# Patient Record
Sex: FEMALE | Race: WHITE | NOT HISPANIC OR LATINO | Employment: FULL TIME | URBAN - METROPOLITAN AREA
[De-identification: names, ages, dates, MRNs, and addresses within clinical notes are randomized per-mention and may not be internally consistent; named-entity substitution may affect disease eponyms.]

---

## 2017-04-01 ENCOUNTER — TRANSFERRED RECORDS (OUTPATIENT)
Dept: HEALTH INFORMATION MANAGEMENT | Facility: CLINIC | Age: 27
End: 2017-04-01

## 2017-04-01 LAB — PAP SMEAR - HIM PATIENT REPORTED: NEGATIVE

## 2018-11-20 ENCOUNTER — TRANSFERRED RECORDS (OUTPATIENT)
Dept: HEALTH INFORMATION MANAGEMENT | Facility: CLINIC | Age: 28
End: 2018-11-20

## 2018-11-21 ENCOUNTER — TRANSFERRED RECORDS (OUTPATIENT)
Dept: HEALTH INFORMATION MANAGEMENT | Facility: CLINIC | Age: 28
End: 2018-11-21

## 2018-12-03 ENCOUNTER — TELEPHONE (OUTPATIENT)
Dept: OBGYN | Facility: CLINIC | Age: 28
End: 2018-12-03

## 2018-12-03 ENCOUNTER — OFFICE VISIT (OUTPATIENT)
Dept: OBGYN | Facility: CLINIC | Age: 28
End: 2018-12-03
Payer: COMMERCIAL

## 2018-12-03 VITALS
BODY MASS INDEX: 24.27 KG/M2 | DIASTOLIC BLOOD PRESSURE: 64 MMHG | WEIGHT: 151 LBS | HEIGHT: 66 IN | SYSTOLIC BLOOD PRESSURE: 110 MMHG | HEART RATE: 72 BPM

## 2018-12-03 DIAGNOSIS — N85.6 ASHERMAN'S SYNDROME: Primary | ICD-10-CM

## 2018-12-03 PROCEDURE — 99204 OFFICE O/P NEW MOD 45 MIN: CPT | Performed by: OBSTETRICS & GYNECOLOGY

## 2018-12-03 ASSESSMENT — ANXIETY QUESTIONNAIRES
2. NOT BEING ABLE TO STOP OR CONTROL WORRYING: NOT AT ALL
1. FEELING NERVOUS, ANXIOUS, OR ON EDGE: NOT AT ALL
GAD7 TOTAL SCORE: 0
5. BEING SO RESTLESS THAT IT IS HARD TO SIT STILL: NOT AT ALL
7. FEELING AFRAID AS IF SOMETHING AWFUL MIGHT HAPPEN: NOT AT ALL
6. BECOMING EASILY ANNOYED OR IRRITABLE: NOT AT ALL
3. WORRYING TOO MUCH ABOUT DIFFERENT THINGS: NOT AT ALL

## 2018-12-03 ASSESSMENT — PATIENT HEALTH QUESTIONNAIRE - PHQ9
SUM OF ALL RESPONSES TO PHQ QUESTIONS 1-9: 0
5. POOR APPETITE OR OVEREATING: NOT AT ALL

## 2018-12-03 NOTE — PROGRESS NOTES
SUBJECTIVE:                                                   Marium Stahl is a 28 year old female who presents to clinic today for the following health issue(s):  Patient presents with:  Consult: here to discuss scaring that needs to be figured out before another IVF cycle.        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  She had a postpartum hemorrhage for retained placenta and now has ultrasound evidence of extensive intrauterine scarring.  She still has fairly regular cycles but they are abnormal.  She does not report any severe central pelvic pain.  She currently works full-time for [employer]      Patient's last menstrual period was 11/13/2018..   Patient is sexually active with female partner, No obstetric history on file..  Using none for contraception.    reports that she has never smoked. She has never used smokeless tobacco.    STD testing offered?  Declined    Health maintenance updated:  yes    Today's PHQ-9 Score:   PHQ-9 SCORE 12/3/2018   PHQ-9 Total Score 0     Today's ANGLE-7 Score:   ANGLE-7 SCORE 12/3/2018   Total Score 0       Problem list and histories reviewed & adjusted, as indicated.  Additional history: as documented.    There is no problem list on file for this patient.    Past Surgical History:   Procedure Laterality Date     APPENDECTOMY  1997     dilation and curretage  07/2017      Social History   Substance Use Topics     Smoking status: Never Smoker     Smokeless tobacco: Never Used     Alcohol use Not on file      Problem (# of Occurrences) Relation (Name,Age of Onset)    Breast Cancer (1) Maternal Aunt    Diabetes (1) Maternal Grandmother    Heart Disease (1) Paternal Grandmother    Hyperlipidemia (2) Mother, Father    Hypertension (1) Mother            Current Outpatient Prescriptions   Medication Sig     UNABLE TO FIND MEDICATION NAME: birth control     No current facility-administered medications for this visit.      Allergies  "  Allergen Reactions     Shellfish-Derived Products        ROS:  12 point review of systems negative other than symptoms noted below.    OBJECTIVE:     /64  Pulse 72  Ht 5' 6\" (1.676 m)  Wt 151 lb (68.5 kg)  LMP 11/13/2018  BMI 24.37 kg/m2  Body mass index is 24.37 kg/(m^2).    Exam:  Constitutional:  Appearance: Well nourished, well developed alert, in no acute distress  Neck:  Lymph Nodes:  No lymphadenopathy present; Thyroid:  Gland size normal, nontender, no nodules or masses present on palpation  Chest:  Respiratory Effort:  Breathing unlabored  Cardiovascular: Heart: Auscultation:  Regular rate, normal rhythm, no murmurs present  Gastrointestinal:  Abdominal Examination:  Abdomen nontender to palpation, tone normal without rigidity or guarding, no masses present, umbilicus without lesions; Liver/Spleen:  No hepatomegaly present, liver nontender to palpation; Hernias:  No hernias present  Lymphatic: Lymph Nodes:  No other lymphadenopathy present  Skin:General Inspection:  No rashes present, no lesions present, no areas of discoloration; Genitalia and Groin:  No rashes present, no lesions present, no areas of discoloration, no masses present.  Neurologic/Psychiatric:  Mental Status:  Oriented X3   Pelvic Exam:  External Genitalia:     Normal appearance for age, no discharge present, no tenderness present, no inflammatory lesions present, color normal  Vagina:     Normal vaginal vault without central or paravaginal defects, no discharge present, no inflammatory lesions present, no masses present  Bladder:     Nontender to palpation  Urethra:   Urethral Body:  Urethra palpation normal, urethra structural support normal   Urethral Meatus:  No erythema or lesions present  Cervix:     Appearance healthy, no lesions present, nontender to palpation, no bleeding present  Uterus:     Uterus: firm, normal sized and nontender, anteverted in position.   Adnexa:     No adnexal tenderness present, no adnexal masses " present  Perineum:     Perineum within normal limits, no evidence of trauma, no rashes or skin lesions present  Anus:     Anus within normal limits, no hemorrhoids present  Inguinal Lymph Nodes:     No lymphadenopathy present  Pubic Hair:     Normal pubic hair distribution for age  Genitalia and Groin:     No rashes present, no lesions present, no areas of discoloration, no masses present       In-Clinic Test Results:      ASSESSMENT/PLAN:                                                      28-year-old  1 with history of postpartum hemorrhage with delivery of an exhilarated lobe and a D&C 13 days later.  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  We recommend a hysteroscopy with lysis of adhesions.  The risks and complications of the procedure were reviewed at length.  This includes general anesthesia blood loss infection injury to bowel bladder and ureters from uterine perforation.  The patient will schedule at her disposition.  She is on birth control pills and should continue them for ongoing suppression.      Davion Egan MD  [facility] [place]

## 2018-12-03 NOTE — Clinical Note
Please abstract the following data from this visit with this patient into the appropriate field in Epic:  Pap smear done on this date: 4/2017 (approximately), by this group: [facility], results were normal.

## 2018-12-03 NOTE — NURSING NOTE
Please abstract the following data from this visit with this patient into the appropriate field in Epic:    Pap smear done on this date: 4/2017 (approximately), by this group: [facility], results were normal.

## 2018-12-03 NOTE — TELEPHONE ENCOUNTER
Type of surgery: HSC w/LYSIS of ADHESIONS  Location of surgery: [facility] OR  Date and time of surgery: 12/13/2018 10:45am ARRIVAL 8:45am  Surgeon: Julisa  Pre-Op Appt Date: 12/13/2018  Post-Op Appt Date: TBD   Packet sent out: HANDED 12/3/2018  Pre-cert/Authorization completed:  TBD  Date: 12/3/2018 Sheila w/Trisha Willson  Surgery Scheduler        Order Questions      Question Answer Comment     Procedure name(s) - multi select Hysteroscopy, lysis of adhesions      Is this a multi surgeon case? No      Laterality N/A      Reason for procedure Asherman's syndrome      Location of Case: [facility] OR      Surgeon Procedure Time (incision to closure) in minutes (per procedure as applicable) 45      Note:  Surgical Case Time Needed (in minutes)     Patient Class (for admit prior to surgery, specify number of days in comments): Same day (hospital outpatient)      Why can t this outpatient surgery be done at the [facility] or [facility]? -      Anesthesia General      Vendor Needed? No

## 2018-12-03 NOTE — MR AVS SNAPSHOT
"              After Visit Summary   12/3/2018    Marium Stahl    MRN: [id number]           Patient Information     Date Of Birth          1990        Visit Information        Provider Department      12/3/2018 9:45 AM Davion Egan MD [facility]        Today's Diagnoses     Asherman's syndrome    -  1       Follow-ups after your visit        Your next 10 appointments already scheduled     Dec 13, 2018   Procedure with Davion Egan MD   [facility] (--)    [address]  [facility] [id number] [phone]              Who to contact     If you have questions or need follow up information about today's clinic visit or your schedule please contact [facility] directly at [phone].  Normal or non-critical lab and imaging results will be communicated to you by MyChart, letter or phone within 4 business days after the clinic has received the results. If you do not hear from us within 7 days, please contact the clinic through MyChart or phone. If you have a critical or abnormal lab result, we will notify you by phone as soon as possible.  Submit refill requests through [employer] or call your pharmacy and they will forward the refill request to us. Please allow 3 business days for your refill to be completed.          Additional Information About Your Visit        MyChart Information     [employer] lets you send messages to your doctor, view your test results, renew your prescriptions, schedule appointments and more. To sign up, go to [email or web address] . Click on \"Log in\" on the left side of the screen, which will take you to the Welcome page. Then click on \"Sign up Now\" on the right side of the page.     You will be asked to enter the access code listed below, as well as some personal information. Please follow the directions to create your username and password.     Your access code is: [email or web address]  Expires: 3/3/2019  9:10 " "AM     Your access code will  in 90 days. If you need help or a new code, please call your [place] clinic or [phone].        Care EveryWhere ID     This is your Care EveryWhere ID. This could be used by other organizations to access your [place] medical records  [id number]        Your Vitals Were     Pulse Height Last Period BMI (Body Mass Index)          72 5' 6\" (1.676 m) 2018 24.37 kg/m2         Blood Pressure from Last 3 Encounters:   18 110/64    Weight from Last 3 Encounters:   18 151 lb (68.5 kg)              We Performed the Following     Teena-Operative Worksheet        Primary Care Provider    None Specified       No primary provider on file.        Equal Access to Services     CHRSI DUEÑAS : Ann Banda, warasta gonzalez, qaybta kaalmada natalie, margarito bullock . So [facility] [phone].    ATENCIÓN: Si habla español, tiene a rivero disposición servicios gratuitos de asistencia lingüística. Llame al [phone].    We comply with applicable federal civil rights laws and [place] laws. We do not discriminate on the basis of race, color, national origin, age, disability, sex, sexual orientation, or gender identity.            Thank you!     Thank you for choosing [facility]  for your care. Our goal is always to provide you with excellent care. Hearing back from our patients is one way we can continue to improve our services. Please take a few minutes to complete the written survey that you may receive in the mail after your visit with us. Thank you!             Your Updated Medication List - Protect others around you: Learn how to safely use, store and throw away your medicines at www.disposemymeds.org.          This list is accurate as of 12/3/18 11:07 AM.  Always use your most recent med list.                   Brand Name Dispense Instructions for use Diagnosis    UNABLE TO FIND      MEDICATION NAME: birth control "

## 2018-12-04 ASSESSMENT — ANXIETY QUESTIONNAIRES: GAD7 TOTAL SCORE: 0

## 2018-12-10 ENCOUNTER — TELEPHONE (OUTPATIENT)
Dept: OBGYN | Facility: CLINIC | Age: 28
End: 2018-12-10

## 2018-12-10 NOTE — TELEPHONE ENCOUNTER
Returned pt call  Surgery scheduled 12/13/18 HYSTEROSCOPY WITH LYSIS OF ADHESIONS  Pt has questions regarding the timing her infertility clinic has in starting the IVF process after surgery    1-1st stages of IVF cycle with starting Lupron on 12/16/18    2-on OCP for hormones at this time and how will this work after surgery    Inquiring on Dr. Duque recommendations on the IVF process after surgery with him.    Routing to Dr. Egan to advise.  Call pt with his response on her cell.

## 2018-12-10 NOTE — TELEPHONE ENCOUNTER
Patient scheduled for surgery with Dr. Egan 12/13.  She has questions about also being due for an IVF cycle and timing with surgery.

## 2018-12-12 NOTE — H&P
12/3/2018  [facility]   Davion Egan MD   OB/Gyn   Asherman's syndrome   Dx   Consult     Reason for Visit    Nursing Note   Sasha Wick CMA (Medical Assistant)      Please abstract the following data from this visit with this patient into the appropriate field in Epic:     Pap smear done on this date: 4/2017 (approximately), by this group: [facility], results were normal.          Progress Notes   Davion Egan MD (Physician)     OB/Gyn            SUBJECTIVE:                                                   Marium Stahl is a 28 year old female who presents to clinic today for the following health issue(s):  Patient presents with:  Consult: here to discuss scaring that needs to be figured out before another IVF cycle.           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  She had a postpartum hemorrhage for retained placenta and now has ultrasound evidence of extensive intrauterine scarring.  She still has fairly regular cycles but they are abnormal.  She does not report any severe central pelvic pain.  She currently works full-time for [employer]        Patient's last menstrual period was 11/13/2018..   Patient is sexually active with female partner, No obstetric history on file..  Using none for contraception.    reports that she has never smoked. She has never used smokeless tobacco.     STD testing offered?  Declined     Health maintenance updated:  yes     Today's PHQ-9 Score:   PHQ-9 SCORE 12/3/2018   PHQ-9 Total Score 0      Today's ANGLE-7 Score:   ANGLE-7 SCORE 12/3/2018   Total Score 0         Problem list and histories reviewed & adjusted, as indicated.  Additional history: as documented.     There is no problem list on file for this patient.            Past Surgical History:   Procedure Laterality Date     APPENDECTOMY   1997     dilation and curretage   07/2017            Social History   Substance Use  "Topics     Smoking status: Never Smoker     Smokeless tobacco: Never Used     Alcohol use Not on file          Problem (# of Occurrences) Relation (Name,Age of Onset)     Breast Cancer (1) Maternal Aunt     Diabetes (1) Maternal Grandmother     Heart Disease (1) Paternal Grandmother     Hyperlipidemia (2) Mother, Father     Hypertension (1) Mother                   Current Outpatient Prescriptions   Medication Sig     UNABLE TO FIND MEDICATION NAME: birth control      No current facility-administered medications for this visit.            Allergies   Allergen Reactions     Shellfish-Derived Products           ROS:  12 point review of systems negative other than symptoms noted below.     OBJECTIVE:      /64  Pulse 72  Ht 5' 6\" (1.676 m)  Wt 151 lb (68.5 kg)  LMP 11/13/2018  BMI 24.37 kg/m2  Body mass index is 24.37 kg/(m^2).     Exam:  Constitutional:  Appearance: Well nourished, well developed alert, in no acute distress  Neck:  Lymph Nodes:  No lymphadenopathy present; Thyroid:  Gland size normal, nontender, no nodules or masses present on palpation  Chest:  Respiratory Effort:  Breathing unlabored  Cardiovascular: Heart: Auscultation:  Regular rate, normal rhythm, no murmurs present  Gastrointestinal:  Abdominal Examination:  Abdomen nontender to palpation, tone normal without rigidity or guarding, no masses present, umbilicus without lesions; Liver/Spleen:  No hepatomegaly present, liver nontender to palpation; Hernias:  No hernias present  Lymphatic: Lymph Nodes:  No other lymphadenopathy present  Skin:General Inspection:  No rashes present, no lesions present, no areas of discoloration; Genitalia and Groin:  No rashes present, no lesions present, no areas of discoloration, no masses present.  Neurologic/Psychiatric:  Mental Status:  Oriented X3   Pelvic Exam:  External Genitalia:                Normal appearance for age, no discharge present, no tenderness present, no inflammatory lesions present, " color normal  Vagina:                Normal vaginal vault without central or paravaginal defects, no discharge present, no inflammatory lesions present, no masses present  Bladder:                Nontender to palpation  Urethra:              Urethral Body:  Urethra palpation normal, urethra structural support normal              Urethral Meatus:  No erythema or lesions present  Cervix:                Appearance healthy, no lesions present, nontender to palpation, no bleeding present  Uterus:                Uterus: firm, normal sized and nontender, anteverted in position.   Adnexa:                No adnexal tenderness present, no adnexal masses present  Perineum:                Perineum within normal limits, no evidence of trauma, no rashes or skin lesions present  Anus:                Anus within normal limits, no hemorrhoids present  Inguinal Lymph Nodes:                No lymphadenopathy present  Pubic Hair:                Normal pubic hair distribution for age  Genitalia and Groin:                No rashes present, no lesions present, no areas of discoloration, no masses present        In-Clinic Test Results:        ASSESSMENT/PLAN:                                                       28-year-old  1 with history of postpartum hemorrhage with delivery of an exhilarated lobe and a D&C 13 days later.  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  We recommend a hysteroscopy with lysis of adhesions.  The risks and complications of the procedure were reviewed at length.  This includes general anesthesia blood loss infection injury to bowel bladder and ureters from uterine perforation.  The patient will schedule at her disposition.  She is on birth control pills and should continue them for ongoing suppression.        Davion Egan MD  [facility]      Instructions      OP AVS (Automatic SnapShot taken 12/3/2018)   Additional Documentation     Vitals:    /64    Pulse  "72    Ht 1.676 m (5' 6\")    Wt 68.5 kg (151 lb)    LMP 11/13/2018    BMI 24.37 kg/m     BSA 1.79 m     Flowsheets:    Vitals Reassessment,    NICU VS,    Anthropometrics,    Ambulatory Assessments,    Infection Screenings       Encounter Info:    Billing Info,    History,    Allergies,    Detailed Report       Communications         Chart Routed to Abstract [employer]   Sent by Sasha Wick CMA   AVS Reports     Date/Time Report Action User   12/3/2018 11:07 AM OP AVS Automatically Generated Davion Egan MD   Encounter Information      Provider Department Encounter # [id number]   12/3/2018 9:45 AM Davion Egan MD [facility] [id number] [facility]   Reviewed this Encounter      Medications Problems Allergies History   Davion Egan MD   Reviewed    Sasha Wick CMA   Reviewed Family, Surgical, Tobacco   Orders Placed      Teena-Operative Worksheet   Medication Changes        None      Medication List   Visit Diagnoses         Asherman's syndrome      Problem List       "

## 2018-12-13 ENCOUNTER — ANESTHESIA EVENT (OUTPATIENT)
Dept: SURGERY | Facility: CLINIC | Age: 28
End: 2018-12-13
Payer: COMMERCIAL

## 2018-12-13 ENCOUNTER — ANESTHESIA (OUTPATIENT)
Dept: SURGERY | Facility: CLINIC | Age: 28
End: 2018-12-13
Payer: COMMERCIAL

## 2018-12-13 ENCOUNTER — SURGERY (OUTPATIENT)
Age: 28
End: 2018-12-13
Payer: COMMERCIAL

## 2018-12-13 ENCOUNTER — HOSPITAL ENCOUNTER (OUTPATIENT)
Facility: CLINIC | Age: 28
Discharge: HOME | End: 2018-12-13
Attending: OBSTETRICS & GYNECOLOGY | Admitting: OBSTETRICS & GYNECOLOGY
Payer: COMMERCIAL

## 2018-12-13 VITALS
BODY MASS INDEX: 24.04 KG/M2 | WEIGHT: 149.6 LBS | OXYGEN SATURATION: 99 % | DIASTOLIC BLOOD PRESSURE: 77 MMHG | HEART RATE: 87 BPM | RESPIRATION RATE: 16 BRPM | TEMPERATURE: 98.4 F | SYSTOLIC BLOOD PRESSURE: 122 MMHG | HEIGHT: 66 IN

## 2018-12-13 DIAGNOSIS — N85.6 ASHERMAN SYNDROME: Primary | ICD-10-CM

## 2018-12-13 LAB — B-HCG SERPL-ACNC: <1 IU/L (ref 0–5)

## 2018-12-13 PROCEDURE — 40000170 ZZH STATISTIC PRE-PROCEDURE ASSESSMENT II: Performed by: OBSTETRICS & GYNECOLOGY

## 2018-12-13 PROCEDURE — 37000008 ZZH ANESTHESIA TECHNICAL FEE, 1ST 30 MIN: Performed by: OBSTETRICS & GYNECOLOGY

## 2018-12-13 PROCEDURE — 71000027 ZZH RECOVERY PHASE 2 EACH 15 MINS: Performed by: OBSTETRICS & GYNECOLOGY

## 2018-12-13 PROCEDURE — 25000125 ZZHC RX 250: Performed by: NURSE ANESTHETIST, CERTIFIED REGISTERED

## 2018-12-13 PROCEDURE — 36415 COLL VENOUS BLD VENIPUNCTURE: CPT | Performed by: OBSTETRICS & GYNECOLOGY

## 2018-12-13 PROCEDURE — 58559 HYSTEROSCOPY LYSIS: CPT | Performed by: OBSTETRICS & GYNECOLOGY

## 2018-12-13 PROCEDURE — 36000058 ZZH SURGERY LEVEL 3 EA 15 ADDTL MIN: Performed by: OBSTETRICS & GYNECOLOGY

## 2018-12-13 PROCEDURE — 25800025 ZZH RX 258: Performed by: OBSTETRICS & GYNECOLOGY

## 2018-12-13 PROCEDURE — 27210794 ZZH OR GENERAL SUPPLY STERILE: Performed by: OBSTETRICS & GYNECOLOGY

## 2018-12-13 PROCEDURE — 84702 CHORIONIC GONADOTROPIN TEST: CPT | Performed by: OBSTETRICS & GYNECOLOGY

## 2018-12-13 PROCEDURE — 25000128 H RX IP 250 OP 636: Performed by: NURSE ANESTHETIST, CERTIFIED REGISTERED

## 2018-12-13 PROCEDURE — 71000012 ZZH RECOVERY PHASE 1 LEVEL 1 FIRST HR: Performed by: OBSTETRICS & GYNECOLOGY

## 2018-12-13 PROCEDURE — 25000128 H RX IP 250 OP 636: Performed by: OBSTETRICS & GYNECOLOGY

## 2018-12-13 PROCEDURE — 25000125 ZZHC RX 250: Performed by: OBSTETRICS & GYNECOLOGY

## 2018-12-13 PROCEDURE — 37000009 ZZH ANESTHESIA TECHNICAL FEE, EACH ADDTL 15 MIN: Performed by: OBSTETRICS & GYNECOLOGY

## 2018-12-13 PROCEDURE — 36000056 ZZH SURGERY LEVEL 3 1ST 30 MIN: Performed by: OBSTETRICS & GYNECOLOGY

## 2018-12-13 RX ORDER — PHENAZOPYRIDINE HYDROCHLORIDE 200 MG/1
200 TABLET, FILM COATED ORAL ONCE
Status: DISCONTINUED | OUTPATIENT
Start: 2018-12-13 | End: 2018-12-13 | Stop reason: CLARIF

## 2018-12-13 RX ORDER — DIPHENHYDRAMINE HYDROCHLORIDE 50 MG/ML
INJECTION INTRAMUSCULAR; INTRAVENOUS PRN
Status: DISCONTINUED | OUTPATIENT
Start: 2018-12-13 | End: 2018-12-13

## 2018-12-13 RX ORDER — FENTANYL CITRATE 50 UG/ML
25-50 INJECTION, SOLUTION INTRAMUSCULAR; INTRAVENOUS EVERY 5 MIN PRN
Status: DISCONTINUED | OUTPATIENT
Start: 2018-12-13 | End: 2018-12-13 | Stop reason: HOSPADM

## 2018-12-13 RX ORDER — NALOXONE HYDROCHLORIDE 0.4 MG/ML
.1-.4 INJECTION, SOLUTION INTRAMUSCULAR; INTRAVENOUS; SUBCUTANEOUS
Status: DISCONTINUED | OUTPATIENT
Start: 2018-12-13 | End: 2018-12-13 | Stop reason: HOSPADM

## 2018-12-13 RX ORDER — CEFAZOLIN SODIUM 1 G/3ML
1 INJECTION, POWDER, FOR SOLUTION INTRAMUSCULAR; INTRAVENOUS SEE ADMIN INSTRUCTIONS
Status: DISCONTINUED | OUTPATIENT
Start: 2018-12-13 | End: 2018-12-13 | Stop reason: HOSPADM

## 2018-12-13 RX ORDER — SODIUM CHLORIDE, SODIUM LACTATE, POTASSIUM CHLORIDE, CALCIUM CHLORIDE 600; 310; 30; 20 MG/100ML; MG/100ML; MG/100ML; MG/100ML
INJECTION, SOLUTION INTRAVENOUS CONTINUOUS PRN
Status: DISCONTINUED | OUTPATIENT
Start: 2018-12-13 | End: 2018-12-13

## 2018-12-13 RX ORDER — PROPOFOL 10 MG/ML
INJECTION, EMULSION INTRAVENOUS PRN
Status: DISCONTINUED | OUTPATIENT
Start: 2018-12-13 | End: 2018-12-13

## 2018-12-13 RX ORDER — CEFAZOLIN SODIUM 2 G/100ML
2 INJECTION, SOLUTION INTRAVENOUS
Status: COMPLETED | OUTPATIENT
Start: 2018-12-13 | End: 2018-12-13

## 2018-12-13 RX ORDER — LIDOCAINE HYDROCHLORIDE 20 MG/ML
INJECTION, SOLUTION INFILTRATION; PERINEURAL PRN
Status: DISCONTINUED | OUTPATIENT
Start: 2018-12-13 | End: 2018-12-13

## 2018-12-13 RX ORDER — FENTANYL CITRATE 50 UG/ML
25-50 INJECTION, SOLUTION INTRAMUSCULAR; INTRAVENOUS
Status: DISCONTINUED | OUTPATIENT
Start: 2018-12-13 | End: 2018-12-13 | Stop reason: HOSPADM

## 2018-12-13 RX ORDER — FENTANYL CITRATE 50 UG/ML
50-100 INJECTION, SOLUTION INTRAMUSCULAR; INTRAVENOUS
Status: DISCONTINUED | OUTPATIENT
Start: 2018-12-13 | End: 2018-12-13 | Stop reason: HOSPADM

## 2018-12-13 RX ORDER — DEXAMETHASONE SODIUM PHOSPHATE 4 MG/ML
INJECTION, SOLUTION INTRA-ARTICULAR; INTRALESIONAL; INTRAMUSCULAR; INTRAVENOUS; SOFT TISSUE PRN
Status: DISCONTINUED | OUTPATIENT
Start: 2018-12-13 | End: 2018-12-13

## 2018-12-13 RX ORDER — MAGNESIUM HYDROXIDE 1200 MG/15ML
LIQUID ORAL PRN
Status: DISCONTINUED | OUTPATIENT
Start: 2018-12-13 | End: 2018-12-13 | Stop reason: HOSPADM

## 2018-12-13 RX ORDER — KETOROLAC TROMETHAMINE 30 MG/ML
INJECTION, SOLUTION INTRAMUSCULAR; INTRAVENOUS PRN
Status: DISCONTINUED | OUTPATIENT
Start: 2018-12-13 | End: 2018-12-13

## 2018-12-13 RX ORDER — ONDANSETRON 4 MG/1
4 TABLET, ORALLY DISINTEGRATING ORAL EVERY 30 MIN PRN
Status: DISCONTINUED | OUTPATIENT
Start: 2018-12-13 | End: 2018-12-13 | Stop reason: HOSPADM

## 2018-12-13 RX ORDER — ONDANSETRON 2 MG/ML
4 INJECTION INTRAMUSCULAR; INTRAVENOUS EVERY 30 MIN PRN
Status: DISCONTINUED | OUTPATIENT
Start: 2018-12-13 | End: 2018-12-13 | Stop reason: HOSPADM

## 2018-12-13 RX ORDER — MEPERIDINE HYDROCHLORIDE 25 MG/ML
12.5 INJECTION INTRAMUSCULAR; INTRAVENOUS; SUBCUTANEOUS
Status: DISCONTINUED | OUTPATIENT
Start: 2018-12-13 | End: 2018-12-13 | Stop reason: HOSPADM

## 2018-12-13 RX ORDER — HYDROCODONE BITARTRATE AND ACETAMINOPHEN 5; 325 MG/1; MG/1
1-2 TABLET ORAL EVERY 4 HOURS PRN
Qty: 10 TABLET | Refills: 0 | Status: SHIPPED | OUTPATIENT
Start: 2018-12-13 | End: 2018-12-16

## 2018-12-13 RX ORDER — ONDANSETRON 2 MG/ML
INJECTION INTRAMUSCULAR; INTRAVENOUS PRN
Status: DISCONTINUED | OUTPATIENT
Start: 2018-12-13 | End: 2018-12-13

## 2018-12-13 RX ORDER — HYDROCODONE BITARTRATE AND ACETAMINOPHEN 5; 325 MG/1; MG/1
1 TABLET ORAL
Status: DISCONTINUED | OUTPATIENT
Start: 2018-12-13 | End: 2018-12-13 | Stop reason: HOSPADM

## 2018-12-13 RX ORDER — PROPOFOL 10 MG/ML
INJECTION, EMULSION INTRAVENOUS CONTINUOUS PRN
Status: DISCONTINUED | OUTPATIENT
Start: 2018-12-13 | End: 2018-12-13

## 2018-12-13 RX ORDER — SODIUM CHLORIDE, SODIUM LACTATE, POTASSIUM CHLORIDE, CALCIUM CHLORIDE 600; 310; 30; 20 MG/100ML; MG/100ML; MG/100ML; MG/100ML
INJECTION, SOLUTION INTRAVENOUS CONTINUOUS
Status: DISCONTINUED | OUTPATIENT
Start: 2018-12-13 | End: 2018-12-13 | Stop reason: HOSPADM

## 2018-12-13 RX ORDER — FENTANYL CITRATE 50 UG/ML
INJECTION, SOLUTION INTRAMUSCULAR; INTRAVENOUS PRN
Status: DISCONTINUED | OUTPATIENT
Start: 2018-12-13 | End: 2018-12-13

## 2018-12-13 RX ORDER — HYDROMORPHONE HYDROCHLORIDE 1 MG/ML
.3-.5 INJECTION, SOLUTION INTRAMUSCULAR; INTRAVENOUS; SUBCUTANEOUS EVERY 10 MIN PRN
Status: DISCONTINUED | OUTPATIENT
Start: 2018-12-13 | End: 2018-12-13 | Stop reason: HOSPADM

## 2018-12-13 RX ADMIN — Medication: at 11:32

## 2018-12-13 RX ADMIN — PROPOFOL 200 MG: 10 INJECTION, EMULSION INTRAVENOUS at 11:43

## 2018-12-13 RX ADMIN — CEFAZOLIN SODIUM 2 G: 2 INJECTION, SOLUTION INTRAVENOUS at 11:51

## 2018-12-13 RX ADMIN — ONDANSETRON 4 MG: 2 INJECTION INTRAMUSCULAR; INTRAVENOUS at 12:23

## 2018-12-13 RX ADMIN — ONDANSETRON 4 MG: 2 INJECTION INTRAMUSCULAR; INTRAVENOUS at 11:57

## 2018-12-13 RX ADMIN — PROPOFOL 180 MCG/KG/MIN: 10 INJECTION, EMULSION INTRAVENOUS at 11:43

## 2018-12-13 RX ADMIN — DIPHENHYDRAMINE HYDROCHLORIDE 12.5 MG: 50 INJECTION, SOLUTION INTRAMUSCULAR; INTRAVENOUS at 12:02

## 2018-12-13 RX ADMIN — SODIUM CHLORIDE 1000 ML: 900 IRRIGANT IRRIGATION at 11:59

## 2018-12-13 RX ADMIN — SODIUM CHLORIDE 1000 ML: 900 IRRIGANT IRRIGATION at 12:07

## 2018-12-13 RX ADMIN — DEXMEDETOMIDINE HYDROCHLORIDE 8 MCG: 100 INJECTION, SOLUTION INTRAVENOUS at 12:04

## 2018-12-13 RX ADMIN — FENTANYL CITRATE 50 MCG: 50 INJECTION, SOLUTION INTRAMUSCULAR; INTRAVENOUS at 11:37

## 2018-12-13 RX ADMIN — FENTANYL CITRATE 50 MCG: 50 INJECTION, SOLUTION INTRAMUSCULAR; INTRAVENOUS at 11:56

## 2018-12-13 RX ADMIN — LIDOCAINE HYDROCHLORIDE 100 MG: 20 INJECTION, SOLUTION INFILTRATION; PERINEURAL at 11:43

## 2018-12-13 RX ADMIN — MIDAZOLAM 2 MG: 1 INJECTION INTRAMUSCULAR; INTRAVENOUS at 11:37

## 2018-12-13 RX ADMIN — KETOROLAC TROMETHAMINE 30 MG: 30 INJECTION, SOLUTION INTRAMUSCULAR at 12:05

## 2018-12-13 RX ADMIN — DEXAMETHASONE SODIUM PHOSPHATE 4 MG: 4 INJECTION, SOLUTION INTRA-ARTICULAR; INTRALESIONAL; INTRAMUSCULAR; INTRAVENOUS; SOFT TISSUE at 11:50

## 2018-12-13 SDOH — HEALTH STABILITY: MENTAL HEALTH: HOW OFTEN DO YOU HAVE A DRINK CONTAINING ALCOHOL?: NEVER

## 2018-12-13 ASSESSMENT — MIFFLIN-ST. JEOR: SCORE: 1425.33

## 2018-12-13 ASSESSMENT — ENCOUNTER SYMPTOMS
SEIZURES: 0
DYSRHYTHMIAS: 0

## 2018-12-13 ASSESSMENT — COPD QUESTIONNAIRES: COPD: 0

## 2018-12-13 ASSESSMENT — LIFESTYLE VARIABLES: TOBACCO_USE: 0

## 2018-12-13 NOTE — DISCHARGE INSTRUCTIONS
Same Day Surgery Discharge Instructions for  Sedation and General Anesthesia       It's not unusual to feel dizzy, light-headed or faint for up to 24 hours after surgery or while taking pain medication.  If you have these symptoms: sit for a few minutes before standing and have someone assist you when you get up to walk or use the bathroom.      You should rest and relax for the next 24 hours. We recommend you make arrangements to have an adult stay with you for at least 24 hours after your discharge.  Avoid hazardous and strenuous activity.      DO NOT DRIVE any vehicle or operate mechanical equipment for 24 hours following the end of your surgery.  Even though you may feel normal, your reactions may be affected by the medication you have received.      Do not drink alcoholic beverages for 24 hours following surgery.       Slowly progress to your regular diet as you feel able. It's not unusual to feel nauseated and/or vomit after receiving anesthesia.  If you develop these symptoms, drink clear liquids (apple juice, ginger ale, broth, 7-up, etc. ) until you feel better.  If your nausea and vomiting persists for 24 hours, please notify your surgeon.        All narcotic pain medications, along with inactivity and anesthesia, can cause constipation. Drinking plenty of liquids and increasing fiber intake will help.      For any questions of a medical nature, call your surgeon.      Do not make important decisions for 24 hours.      If you had general anesthesia, you may have a sore throat for a couple of days related to the breathing tube used during surgery.  You may use Cepacol lozenges to help with this discomfort.  If it worsens or if you develop a fever, contact your surgeon.       If you feel your pain is not well managed with the pain medications prescribed by your surgeon, please contact your surgeon's office to let them know so they can address your concerns.       [facility]  Discharge  Instructions  Following D & C / Hysteroscopy    Activity  You may resume normal activities including lifting as needed.  It is permissible to climb stairs. You may drive after 24 hours as long as you are not taking narcotic pain pills.  Baths or showers are perfectly acceptable.      Vaginal Discharge  You may have some vaginal bleeding or discharge for about a week after procedure.  You may use tampons or pads.    Temperature  If you develop temperature elevations to over 101  Fahrenheit, your physician should be called immediately.    Diet  [place] or light diet is advisable the day of surgery.  If nausea persists, continue this diet.  If severe, call.    Follow-up  Make an appointment in 1-2 weeks if instructed to at: [phone]        [facility]  [phone]        Today you received Toradol, an antiinflammatory medication similar to Ibuprofen.  You should not take other antiinflammatory medication, such as Ibuprofen, Motrin, Advil, Aleve, Naprosyn, etc until 6 pm.

## 2018-12-13 NOTE — ANESTHESIA POSTPROCEDURE EVALUATION
Patient: Marium Stahl    Procedure(s):  HYSTEROSCOPY WITH LYSIS OF  INTRAUTERINE ADHESIONS    Diagnosis:ASHERMAN SYNDROME  Diagnosis Additional Information: No value filed.    Anesthesia Type:  General, LMA    Note:  Anesthesia Post Evaluation    Patient location during evaluation: PACU  Patient participation: Able to fully participate in evaluation  Level of consciousness: awake and alert  Pain management: adequate  Airway patency: patent  Cardiovascular status: acceptable and hemodynamically stable  Respiratory status: nonlabored ventilation, unassisted and acceptable  Hydration status: acceptable  PONV: none             Last vitals:  Vitals:    12/13/18 1300 12/13/18 1315 12/13/18 1417   BP: 125/79 128/79 122/77   Pulse:      Resp:   16   Temp: 36.9  C (98.4  F)     SpO2: 98% 99% 99%         Electronically Signed By: Zach Merchant MD  December 13, 2018  4:21 PM

## 2018-12-13 NOTE — ANESTHESIA PREPROCEDURE EVALUATION
Anesthesia Pre-Procedure Evaluation    Patient: Marium Stahl   MRN: [id number] : 1990          Preoperative Diagnosis: ASHERMAN SYNDROME    Procedure(s):  HYSTEROSCOPY WITH LYSIS OF ADHESIONS    Past Medical History:   Diagnosis Date     Migraines      Past Surgical History:   Procedure Laterality Date     APPENDECTOMY       DILATION AND CURETTAGE       dilation and curretage  2017     [facility]         Anesthesia Evaluation     . Pt has had prior anesthetic. Type: General    History of anesthetic complications   - PONV and motion sickness        ROS/MED HX    ENT/Pulmonary:      (-) tobacco use, asthma, COPD, sleep apnea and recent URI   Neurologic:      (-) seizures and CVA   Cardiovascular:        (-) angina, hypertension, CAD, syncope, arrhythmias, irregular heartbeat/palpitations, valvular problems/murmurs and angina   METS/Exercise Tolerance:  >4 METS   Hematologic:        (-) History of Transfusion   Musculoskeletal:         GI/Hepatic:        (-) GERD and liver disease   Renal/Genitourinary:      (-) renal disease   Endo:      (-) Type II DM, thyroid disease and chronic steroid usage   Psychiatric:         Infectious Disease:         Malignancy:         Other:                          Physical Exam  Normal systems: dental    Airway   Mallampati: I  TM distance: >3 FB  Neck ROM: full    Dental     Cardiovascular   Rhythm and rate: regular and normal  (-) no murmur    Pulmonary    breath sounds clear to auscultation(-) no wheezes            No results found for: WBC, HGB, HCT, PLT, CRP, SED, NA, POTASSIUM, CHLORIDE, CO2, BUN, CR, GLC, LEX, PHOS, MAG, ALBUMIN, PROTTOTAL, ALT, AST, GGT, ALKPHOS, BILITOTAL, BILIDIRECT, LIPASE, AMYLASE, SUZANNA, PTT, INR, FIBR, TSH, T4, T3, HCG, HCGS, CKTOTAL, CKMB, TROPN    Preop Vitals  BP Readings from Last 3 Encounters:   18 124/90   18 110/64    Pulse Readings from Last 3 Encounters:   18 87   18 72      Resp Readings from Last  "3 Encounters:   12/13/18 16    SpO2 Readings from Last 3 Encounters:   12/13/18 99%      Temp Readings from Last 1 Encounters:   12/13/18 36.8  C (98.2  F) (Oral)    Ht Readings from Last 1 Encounters:   12/13/18 1.676 m (5' 6\")      Wt Readings from Last 1 Encounters:   12/13/18 67.9 kg (149 lb 9.6 oz)    Estimated body mass index is 24.15 kg/m  as calculated from the following:    Height as of this encounter: 1.676 m (5' 6\").    Weight as of this encounter: 67.9 kg (149 lb 9.6 oz).       Anesthesia Plan      History & Physical Review  History and physical reviewed and following examination; no interval change.    ASA Status:  1 .    NPO Status:  > 8 hours    Plan for General and LMA with Propofol and Intravenous induction. Maintenance will be TIVA.    PONV prophylaxis:  Ondansetron (or other 5HT-3), Dexamethasone or Solumedrol and Other (See comment)  Propofol infusion/TIVA  Decadron prior to incision  Zofran 8 mg (divided)  Benadryl 12.5 mg       Postoperative Care  Postoperative pain management:  Multi-modal analgesia.      Consents  Anesthetic plan, risks, benefits and alternatives discussed with:  Patient..                 Zach Merchant MD  "

## 2018-12-13 NOTE — ANESTHESIA CARE TRANSFER NOTE
Patient: Marium Stahl    Procedure(s):  HYSTEROSCOPY WITH LYSIS OF  INTRAUTERINE ADHESIONS    Diagnosis: ASHERMAN SYNDROME  Diagnosis Additional Information: No value filed.    Anesthesia Type:   General, LMA     Note:  Airway :Face Mask  Patient transferred to:PACU  Handoff Report: Identifed the Patient, Identified the Reponsible Provider, Reviewed the pertinent medical history, Discussed the surgical course, Reviewed Intra-OP anesthesia mangement and issues during anesthesia, Set expectations for post-procedure period and Allowed opportunity for questions and acknowledgement of understanding    Awake, talking, good exchange, report to RN  Vitals: (Last set prior to Anesthesia Care Transfer)    CRNA VITALS  12/13/2018 1146 - 12/13/2018 1225      12/13/2018             NIBP:  144/121  (Abnormal)     NIBP Mean:  129    Resp Rate (set):  10        BP: 133/85  P: 87  SAO2:100%        Electronically Signed By: MEL Caldera CRNA  December 13, 2018  12:25 PM

## 2018-12-13 NOTE — BRIEF OP NOTE
[facility] Brief Operative Note    Pre-operative diagnosis: ASHERMAN SYNDROME   Post-operative diagnosis same   Procedure: Procedure(s):  HYSTEROSCOPY WITH LYSIS OF ADHESIONS   Surgeon(s): Surgeon(s) and Role:     * Davion Egan MD - Primary   Estimated blood loss: 2 ml  Fluid def 500 cc        Findings: Severe intrauterine adhesions

## 2018-12-14 ENCOUNTER — TELEPHONE (OUTPATIENT)
Dept: OBGYN | Facility: CLINIC | Age: 28
End: 2018-12-14

## 2018-12-14 NOTE — OP NOTE
Procedure Date: 2018      PREOPERATIVE DIAGNOSIS: Asherman syndrome.      POSTOPERATIVE DIAGNOSIS:  Asherman syndrome.      PROCEDURE:  Hysteroscopy, extensive lysis of severe intrauterine adhesions.      OPERATIVE FINDINGS:  The patient had obliteration of the entire lower uterine segment from midline, dense adhesions.  Once the adhesions were taken down, we could identify normal-appearing endometrium in the upper endometrial cavity.  Both tubal ostia were visualized.  The adhesions were completely vaporized away to their base.      OPERATIVE PROCEDURE:  After general anesthesia was induced, the patient was placed in the dorsal lithotomy position and prepped and draped in the usual fashion.  The anterior cervix was grasped.  The endocervix was carefully dilated.  The hysteroscope was placed with the above findings noted.  The VersaPoint was used to take down these very dense midline adhesions that went from the lower uterine segment to 2/3 of the way up the endometrial cavity.  Once the adhesions were taken down, we could identify and photo document both tubal ostia.  The upper cavity endometrium looked normal.  At the conclusion of the procedure, the vaporization sites from bipolar cautery were debrided.  The cavity looked completely normal  at that time.  The patient tolerated this well and went to the recovery room.  She will be given Norco as needed for pain.  We will give her Premarin 1.25 mg for 21 days.  She will return to see us in 2 weeks.         NIDIA KEEN JR, MD             D: 2018   T: 2018   MT: DAIN      Name:     LAXMI MERCHANT   MRN:      [id number]        Account:        [id number]   :      1990           Procedure Date: 2018      Document: [id number]       cc: [facility]

## 2018-12-14 NOTE — TELEPHONE ENCOUNTER
Spoke with Maribel Bhardwaj NP regarding pts question if she can continue taking her OCP's and premarin at the same time. Per Maribel ok for pt to continue the OCP's. The point of the premarin is to regenerate the uterine lining for implantation. Pt verbalizes understanding and will f/u with Dr. Egan as advised in 2 wks.  Moriah Jean-Baptiste RN on 12/14/2018 at 3:19 PM

## 2018-12-27 ENCOUNTER — OFFICE VISIT (OUTPATIENT)
Dept: OBGYN | Facility: CLINIC | Age: 28
End: 2018-12-27
Payer: COMMERCIAL

## 2018-12-27 VITALS — DIASTOLIC BLOOD PRESSURE: 70 MMHG | BODY MASS INDEX: 24.18 KG/M2 | SYSTOLIC BLOOD PRESSURE: 124 MMHG | WEIGHT: 149.8 LBS

## 2018-12-27 DIAGNOSIS — Z09 POSTOP CHECK: Primary | ICD-10-CM

## 2018-12-27 PROCEDURE — 99212 OFFICE O/P EST SF 10 MIN: CPT | Performed by: OBSTETRICS & GYNECOLOGY

## 2018-12-27 NOTE — PROGRESS NOTES
The patient is seen for a postoperative check.  We discussed how dense her intrauterine adhesions were and how we approached removal.  She is on Premarin for 1 more week and will stay on birth control pills.  If she has a breakthrough menses we will allow that.  She needs a follow-up sonohysterogram before going back to [facility]

## 2019-01-24 ENCOUNTER — ANCILLARY PROCEDURE (OUTPATIENT)
Dept: ULTRASOUND IMAGING | Facility: CLINIC | Age: 29
End: 2019-01-24
Payer: COMMERCIAL

## 2019-01-24 ENCOUNTER — OFFICE VISIT (OUTPATIENT)
Dept: OBGYN | Facility: CLINIC | Age: 29
End: 2019-01-24
Payer: COMMERCIAL

## 2019-01-24 DIAGNOSIS — N85.6 ASHERMAN SYNDROME: Primary | ICD-10-CM

## 2019-01-24 DIAGNOSIS — N85.6 ASHERMAN SYNDROME: ICD-10-CM

## 2019-01-24 PROCEDURE — 76831 ECHO EXAM UTERUS: CPT | Mod: 51 | Performed by: OBSTETRICS & GYNECOLOGY

## 2019-01-24 PROCEDURE — 99207 ZZC NO BILLABLE SERVICE THIS VISIT: CPT | Performed by: OBSTETRICS & GYNECOLOGY

## 2019-01-24 PROCEDURE — 58340 CATHETER FOR HYSTEROGRAPHY: CPT | Performed by: OBSTETRICS & GYNECOLOGY

## 2019-01-24 NOTE — PROGRESS NOTES
The patient is seen at this time for a saline sonogram as follow-up of an extensive hysteroscopic lysis of adhesions for Asherman syndrome.  Informed consent was obtained.  A open sided speculum was placed and the vagina and cervix were prepped.  The common cannula was placed through the cervix.  The vaginal probe was placed inferior to this and when centrally placed dye was injected.  The cavity filled nicely at this point.  Initial visualization of the scan shows  no intrauterine adhesions present.  The patient tolerated the procedure well.  The couple are very excited and will return to their primary clinic.

## (undated) DEVICE — ESU ELEC VERSAPOINT SPRING TIP

## (undated) DEVICE — TUBING IRRIG TUR Y TYPE 96" LF 6543-01

## (undated) DEVICE — PACK TVT HYSTEROSCOPY SMA15HYFSE

## (undated) DEVICE — GLOVE PROTEXIS W/NEU-THERA 7.5  2D73TE75

## (undated) DEVICE — LINEN TOWEL PACK X5 5464

## (undated) DEVICE — SOL NACL 0.9% INJ 1000ML BAG 2B1324X

## (undated) DEVICE — SOL NACL 0.9% IRRIG 1000ML BOTTLE 07138-09

## (undated) DEVICE — SOL WATER IRRIG 1000ML BOTTLE 2F7114

## (undated) DEVICE — TUBING SUCTION 12"X1/4" N612

## (undated) DEVICE — GOWN IMPERVIOUS SPECIALTY XLG/XLONG 32474

## (undated) DEVICE — SUCTION CANISTER MEDIVAC LINER 3000ML W/LID 65651-530

## (undated) RX ORDER — ONDANSETRON 2 MG/ML
INJECTION INTRAMUSCULAR; INTRAVENOUS
Status: DISPENSED
Start: 2018-12-13

## (undated) RX ORDER — KETOROLAC TROMETHAMINE 30 MG/ML
INJECTION, SOLUTION INTRAMUSCULAR; INTRAVENOUS
Status: DISPENSED
Start: 2018-12-13

## (undated) RX ORDER — PROPOFOL 10 MG/ML
INJECTION, EMULSION INTRAVENOUS
Status: DISPENSED
Start: 2018-12-13

## (undated) RX ORDER — DIPHENHYDRAMINE HYDROCHLORIDE 50 MG/ML
INJECTION INTRAMUSCULAR; INTRAVENOUS
Status: DISPENSED
Start: 2018-12-13

## (undated) RX ORDER — FENTANYL CITRATE 50 UG/ML
INJECTION, SOLUTION INTRAMUSCULAR; INTRAVENOUS
Status: DISPENSED
Start: 2018-12-13

## (undated) RX ORDER — LIDOCAINE HYDROCHLORIDE 20 MG/ML
INJECTION, SOLUTION EPIDURAL; INFILTRATION; INTRACAUDAL; PERINEURAL
Status: DISPENSED
Start: 2018-12-13

## (undated) RX ORDER — DEXAMETHASONE SODIUM PHOSPHATE 4 MG/ML
INJECTION, SOLUTION INTRA-ARTICULAR; INTRALESIONAL; INTRAMUSCULAR; INTRAVENOUS; SOFT TISSUE
Status: DISPENSED
Start: 2018-12-13

## (undated) RX ORDER — CEFAZOLIN SODIUM 2 G/100ML
INJECTION, SOLUTION INTRAVENOUS
Status: DISPENSED
Start: 2018-12-13